# Patient Record
Sex: MALE | Race: WHITE | URBAN - METROPOLITAN AREA
[De-identification: names, ages, dates, MRNs, and addresses within clinical notes are randomized per-mention and may not be internally consistent; named-entity substitution may affect disease eponyms.]

---

## 2023-01-01 ENCOUNTER — HOSPICE ADMISSION (OUTPATIENT)
Dept: HOSPICE | Facility: HOSPICE | Age: 83
End: 2023-01-01
Payer: MEDICARE

## 2023-01-01 ENCOUNTER — HOME CARE VISIT (OUTPATIENT)
Dept: HOSPICE | Facility: HOSPICE | Age: 83
End: 2023-01-01
Payer: MEDICARE

## 2023-01-01 PROCEDURE — 0655 HSPC INPATIENT RESPITE

## 2023-01-01 PROCEDURE — 0651 HSPC ROUTINE HOME CARE

## 2023-01-01 PROCEDURE — 3331090004 HSPC SERVICE INTENSITY ADD-ON

## 2023-01-01 PROCEDURE — G0299 HHS/HOSPICE OF RN EA 15 MIN: HCPCS

## 2023-01-01 PROCEDURE — 0656 HSPC GENERAL INPATIENT

## 2023-01-01 RX ORDER — METOPROLOL SUCCINATE 25 MG/1
12.5 TABLET, EXTENDED RELEASE ORAL DAILY
COMMUNITY
Start: 2023-01-01 | End: 2023-01-01

## 2023-01-01 RX ORDER — TORSEMIDE 100 MG/1
1 TABLET ORAL DAILY
COMMUNITY
Start: 2023-01-01

## 2023-01-01 RX ORDER — ACETAMINOPHEN 325 MG/1
2 TABLET ORAL EVERY 6 HOURS PRN
COMMUNITY
Start: 2023-01-01

## 2023-01-01 RX ORDER — SPIRONOLACTONE 25 MG/1
1 TABLET ORAL DAILY
COMMUNITY
Start: 2023-01-01

## 2023-12-07 ENCOUNTER — HOME CARE VISIT (OUTPATIENT)
Dept: HOSPICE | Facility: HOSPICE | Age: 83
End: 2023-12-07
Payer: MEDICARE

## 2023-12-07 ENCOUNTER — HOME CARE VISIT (OUTPATIENT)
Dept: SCHEDULING | Facility: HOME HEALTH | Age: 83
End: 2023-12-07
Payer: MEDICARE

## 2023-12-07 PROBLEM — I70.263: Status: ACTIVE | Noted: 2023-12-07

## 2023-12-07 PROBLEM — I70.262: Status: ACTIVE | Noted: 2023-12-07

## 2023-12-07 PROCEDURE — G0299 HHS/HOSPICE OF RN EA 15 MIN: HCPCS

## 2023-12-08 ENCOUNTER — HOME CARE VISIT (OUTPATIENT)
Dept: SCHEDULING | Facility: HOME HEALTH | Age: 83
End: 2023-12-08
Payer: MEDICARE

## 2023-12-08 ENCOUNTER — HOME CARE VISIT (OUTPATIENT)
Dept: HOSPICE | Facility: HOSPICE | Age: 83
End: 2023-12-08
Payer: MEDICARE

## 2023-12-08 VITALS
TEMPERATURE: 97 F | DIASTOLIC BLOOD PRESSURE: 70 MMHG | RESPIRATION RATE: 20 BRPM | SYSTOLIC BLOOD PRESSURE: 110 MMHG | WEIGHT: 136 LBS | HEART RATE: 88 BPM | HEIGHT: 71 IN | BODY MASS INDEX: 19.04 KG/M2

## 2023-12-08 PROCEDURE — G0299 HHS/HOSPICE OF RN EA 15 MIN: HCPCS

## 2023-12-08 PROCEDURE — G0155 HHCP-SVS OF CSW,EA 15 MIN: HCPCS

## 2023-12-08 ASSESSMENT — ENCOUNTER SYMPTOMS
COUGH: 1
COUGH CHARACTERISTICS: NON-PRODUCTIVE
BOWEL INCONTINENCE: 1
PAIN LOCATION - PAIN QUALITY: SHARP

## 2023-12-08 NOTE — CASE COMMUNICATION
contacted and spoke with Elias Osullivan, pt's son who is currently on active duty in [facility]. Elias Osullivan declined  services r/t his father's verbal decline for a  to come in. Elias Osullivan is planning to come down tomorrow to visit with his father. Elias Osullivan stated he would revisit the option of  coming in and requested the  to call him next week to discuss further.  agreed. For now, 's initial visit, spir itual assessment, and services are declined.

## 2023-12-08 NOTE — HOSPICE
Mr. 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 Pt is being cared for in post acute facility in [address] [facility]. A thorough initial assessment was not possible due to his unpredictable irritability and agitation and  resulting in him being uncooperative and refusing wound assessment. Pt was easily rousable, he said he was doing alright and denied discomfort or pain at rest.  He said he only has severe pain in his feet if he has to get up or move He has a what appears to be a large size arterial insufficiency ulcer, possibly necrotic in his left dorsal foot. Also necrotic left toes. Right foot ulcer smaller than right foot. He has refused for hospice admission nurse to remove foot  dressing for inspection and assessment. Left sole of foot puffy. Both feet warm to touch; both knees cool to touch with tinge of mottling. Nail beds pale with touch of cyanosis. He had taken few bites of pureed type dinner and said food was not good. There was no evidence of dysphagia. He was breathing normal on room air and he denied chest tightness and  difficulty breathing. Lungs are essentially clear sridevi, and has occasional wet non productive cough. .He is bedbound, and incontinent, has a urinal at bedside. PPS 30, DNR. Family not present in room this evening. /70, HR 88 apical irreg, RR 20  Pts medical history include DM 2 , acute on chronic CHF with LVEF 20-25%, Atherosclerosis to lower extremities, dementia, CKD 3, chronic atrial fibrillation with cardiomyopathy, chronic SFA occlusion, pt refused amputation. Facility staff informed of hospice admission,  instructed staff to calll hospice with any questions or pt issues; meds profiled with Magan Palomino NP and pain medication script faxed to [address] [place] [address].

## 2023-12-09 ENCOUNTER — HOME CARE VISIT (OUTPATIENT)
Dept: HOSPICE | Facility: HOSPICE | Age: 83
End: 2023-12-09
Payer: MEDICARE

## 2023-12-12 ENCOUNTER — HOME CARE VISIT (OUTPATIENT)
Dept: SCHEDULING | Facility: HOME HEALTH | Age: 83
End: 2023-12-12
Payer: MEDICARE

## 2023-12-12 ENCOUNTER — HOME CARE VISIT (OUTPATIENT)
Dept: HOSPICE | Facility: HOSPICE | Age: 83
End: 2023-12-12
Payer: MEDICARE

## 2023-12-12 VITALS — HEART RATE: 85 BPM | SYSTOLIC BLOOD PRESSURE: 138 MMHG | DIASTOLIC BLOOD PRESSURE: 88 MMHG | RESPIRATION RATE: 16 BRPM

## 2023-12-12 PROBLEM — Z51.5 HOSPICE CARE: Status: ACTIVE | Noted: 2023-01-01

## 2023-12-12 PROCEDURE — G0156 HHCP-SVS OF AIDE,EA 15 MIN: HCPCS

## 2023-12-12 NOTE — CASE COMMUNICATION
contacted and spoke with Colin Bray, pt's son, who is travelling to [place], [place] today. 's call was a follow-up conversation with Colin Bray regarding Harlin Heimlich coming in to visit with his father. Colin Bray confirmed his father is declining 's initial visit, spiritual assessment, and  services.  affirmed their decision and stated that he will continue to work with the team and providing input in the plan of  care discussions and if he/his father change their minds and would like the  to visit to please call or let the RnCm know. Colin Bray stated that he would.  shared that he would pray for travel mercies for him as he goes to [facility]. Grupo appreciated the call. As for now,  services are declined.

## 2023-12-13 PROBLEM — I48.20 CHRONIC ATRIAL FIBRILLATION (HCC): Status: ACTIVE | Noted: 2023-09-13

## 2023-12-13 PROBLEM — I50.22 CHRONIC HFREF (HEART FAILURE WITH REDUCED EJECTION FRACTION) (HCC): Status: ACTIVE | Noted: 2023-09-13

## 2023-12-13 PROBLEM — J84.9 INTERSTITIAL LUNG DISEASE (HCC): Status: ACTIVE | Noted: 2020-03-18

## 2023-12-13 PROBLEM — I50.20 HEART FAILURE WITH REDUCED EJECTION FRACTION (HCC): Status: ACTIVE | Noted: 2023-01-01

## 2023-12-13 PROBLEM — T50.8X5A CONTRAST-INDUCED NEPHROPATHY: Status: ACTIVE | Noted: 2023-09-19

## 2023-12-13 PROBLEM — I10 PRIMARY HYPERTENSION: Status: ACTIVE | Noted: 2022-11-17

## 2023-12-13 PROBLEM — R53.81 DEBILITY: Status: ACTIVE | Noted: 2023-08-20

## 2023-12-13 PROBLEM — D52.9 ANEMIA DUE TO FOLIC ACID DEFICIENCY: Status: ACTIVE | Noted: 2022-12-20

## 2023-12-13 PROBLEM — J43.8 OTHER EMPHYSEMA (HCC): Status: ACTIVE | Noted: 2020-03-18

## 2023-12-13 PROBLEM — R13.10 DYSPHAGIA: Status: ACTIVE | Noted: 2022-09-09

## 2023-12-13 PROBLEM — I50.43 ACUTE ON CHRONIC COMBINED SYSTOLIC AND DIASTOLIC CONGESTIVE HEART FAILURE (HCC): Status: ACTIVE | Noted: 2020-03-18

## 2023-12-13 PROBLEM — I96 GANGRENE OF FOOT (HCC): Status: ACTIVE | Noted: 2023-01-01

## 2023-12-13 PROBLEM — I51.89 MILD LEFT VENTRICULAR SYSTOLIC DYSFUNCTION: Status: ACTIVE | Noted: 2022-11-17

## 2023-12-13 PROBLEM — F02.80 LATE ONSET ALZHEIMER'S DEMENTIA WITHOUT BEHAVIORAL DISTURBANCE (HCC): Status: ACTIVE | Noted: 2022-06-22

## 2023-12-13 PROBLEM — J96.01 ACUTE HYPOXIC RESPIRATORY FAILURE (HCC): Status: ACTIVE | Noted: 2023-11-27

## 2023-12-13 PROBLEM — R41.840 ATTENTION AND CONCENTRATION DEFICIT: Status: ACTIVE | Noted: 2022-07-12

## 2023-12-13 PROBLEM — E43 SEVERE PROTEIN-CALORIE MALNUTRITION (HCC): Status: ACTIVE | Noted: 2023-11-27

## 2023-12-13 PROBLEM — M1A.0720 IDIOPATHIC CHRONIC GOUT OF LEFT FOOT WITHOUT TOPHUS: Status: ACTIVE | Noted: 2020-03-18

## 2023-12-13 PROBLEM — G30.1 LATE ONSET ALZHEIMER'S DEMENTIA WITHOUT BEHAVIORAL DISTURBANCE (HCC): Status: ACTIVE | Noted: 2022-06-22

## 2023-12-13 PROBLEM — N14.11 CONTRAST-INDUCED NEPHROPATHY: Status: ACTIVE | Noted: 2023-09-19

## 2023-12-13 PROBLEM — N18.32 STAGE 3B CHRONIC KIDNEY DISEASE (HCC): Status: ACTIVE | Noted: 2020-03-18

## 2023-12-13 PROBLEM — E11.65 TYPE 2 DIABETES MELLITUS WITH HYPERGLYCEMIA (HCC): Status: ACTIVE | Noted: 2020-03-18

## 2023-12-14 ENCOUNTER — HOME CARE VISIT (OUTPATIENT)
Dept: HOSPICE | Facility: HOSPICE | Age: 83
End: 2023-12-14
Payer: MEDICARE

## 2023-12-14 ENCOUNTER — HOME CARE VISIT (OUTPATIENT)
Dept: SCHEDULING | Facility: HOME HEALTH | Age: 83
End: 2023-12-14
Payer: MEDICARE

## 2023-12-14 PROCEDURE — G0299 HHS/HOSPICE OF RN EA 15 MIN: HCPCS

## 2023-12-14 PROCEDURE — G0156 HHCP-SVS OF AIDE,EA 15 MIN: HCPCS

## 2023-12-26 ENCOUNTER — HOME CARE VISIT (OUTPATIENT)
Dept: SCHEDULING | Facility: HOME HEALTH | Age: 83
End: 2023-12-26
Payer: MEDICARE

## 2023-12-26 VITALS — SYSTOLIC BLOOD PRESSURE: 142 MMHG | RESPIRATION RATE: 16 BRPM | DIASTOLIC BLOOD PRESSURE: 92 MMHG

## 2023-12-26 PROCEDURE — G0156 HHCP-SVS OF AIDE,EA 15 MIN: HCPCS

## 2023-12-26 NOTE — HOSPICE
lighting/night lights  -Keep floor clean and dry. -Non-slip, well-fitting footwear. *Medications reviewed/managed: Pt is taking medications whole/crushed as prescribed and medications are managed by [facility] skilled nursing staff. [facility]) Skilled Nurse (SN) will review medications weekly. *Pt resides at [address] [place] [address] on the TCU unit. Coordination/Collaboration of pt's care occurs in various ways with the hospice Skilled Nurse (SN) and hospice care team via pt visits, the facility staff, pt's facility care plan meetings and during the hospice interdisciplinary team's biweekly meetings (IDG). Hospice drives pt's care, therefore; the facility staff are advised to contact [address]) [phone] regarding any pt concerns, fall(s) and/or any change in pt's condition. Facility staff voices an understanding regarding call hospice first, not 911.

## 2023-12-29 ENCOUNTER — HOME CARE VISIT (OUTPATIENT)
Dept: SCHEDULING | Facility: HOME HEALTH | Age: 83
End: 2023-12-29
Payer: MEDICARE

## 2023-12-29 PROCEDURE — G0156 HHCP-SVS OF AIDE,EA 15 MIN: HCPCS

## 2024-01-01 ENCOUNTER — HOME CARE VISIT (OUTPATIENT)
Dept: HOSPICE | Facility: HOSPICE | Age: 84
End: 2024-01-01
Payer: MEDICARE

## 2024-01-01 PROCEDURE — 0651 HSPC ROUTINE HOME CARE

## 2024-01-01 PROCEDURE — G0299 HHS/HOSPICE OF RN EA 15 MIN: HCPCS

## 2024-01-02 VITALS
SYSTOLIC BLOOD PRESSURE: 106 MMHG | DIASTOLIC BLOOD PRESSURE: 63 MMHG | RESPIRATION RATE: 16 BRPM | RESPIRATION RATE: 16 BRPM | HEART RATE: 78 BPM | SYSTOLIC BLOOD PRESSURE: 138 MMHG | DIASTOLIC BLOOD PRESSURE: 90 MMHG | HEART RATE: 96 BPM

## 2024-01-02 ASSESSMENT — ENCOUNTER SYMPTOMS
BOWEL INCONTINENCE: 1
STOOL DESCRIPTION: FORMED
BOWEL INCONTINENCE: 1
HEMOPTYSIS: 0
STOOL DESCRIPTION: FORMED

## 2024-01-02 NOTE — HOSPICE
an 84yo male with a hospice diagnosis of Critical limb ischemia of both lower extremities with gangrene        Pt is alert, fully oriented to self and communicates with comprehensible speech. Intermittent confusion noted.  Pt explained the wound care procedure, pt voices an understanding of the procedure.  *12/29/23- Wound Care performed by this nurse. Left foot ischemic wound- Performed hand hygiene, removed old dressing. Cleansed area with NS and applied hydrofera blue (saturated with NS to activate before placement) and covered with a foam dressing. 6.7cm x 5.5cm x 0.   *Pt tolerated wound care with minimum distress.   New Order:  *12/29/23- Roxanol 20mg/ml: Give 5mg (=0.25ml) PO/SL @ Noon & 9:00pm. Continue PRN Roxanol  -Pt has had his 12:00pm (Noon) dose.        *Neurological/Level of Consciousness (LOC): At Start of Care (SOC), pt is alert, fully oriented to self. Pt is able to communicate with normal, comprehensible speech, has appropriate concentration and follows commands.     *12/19/23- Intermittent confusion noted.      *Cardiovascular: Heart sounds are of an irregular rate and rhythm      *Respiratory: No noted respiratory distress at rest, respirations are even and unlabored. Lung sounds are clear throughout bases.     *Urinary/Gastrointestinal: Pt denies any voiding concerns, pt maintains a urinal at bedside and attempt to utilize it. . Bowel sounds present in all quads. Bowel Movements (BMs) are reported less daily. Pt is incontinent of bowel & bladder (B&B).    *Appetite/Nutrition: Pt reports a fair appetite, nutritional & fluid intake. No reported nausea/vomiting.   *12/14/23- The facility staff reports a poor nutritional intake. Pt is on a [facility] Soft diet.  *Pt continues to have a poor nutritional intake..    *Weight/Mid Upper Arm Circumference (MUAC): At Start of Care (SOC), Left arm MUAC =21cm.      *Integumentary (Skin): At Start of Care (SOC), pt wound not allow assessment or wound care of lower

## 2024-01-02 NOTE — HOSPICE
Assessment of death completed by Mario Cooper RN. Agency staff was not present at time of death. [facility] called to report patient Priyank Pardo was no longer breathing, This RN arrived at the facility and auscultated for heart and lung sounds for 3 minutes, no heart or breath sounds heard. Dr. Familia English was notified, time of death was 1/1/2023 at 8:30 pm Grupo (patient son)  at the bedside, grieving appropriately. Family   services at this time, and declined immediate SW services at this time, informed family that the SW will reach out. [facility] notified of patient's death, [facility] will  DME. Medications were disposed of per post acute agency and by facility staff. Informed family that the Bereavement services will also be reaching out. [employer] took possession of the body.

## 2024-01-02 NOTE — HOSPICE
[facility] ([facility]) Medical Director Virgilio Peters MD Home Visit      Hospice Medical Director MD Riki Home Visit: With permission from pt, the hospice MD performs a mini physical and cognitive assessment. Pt's current medications are thoroughly reviewed. Medication adjustments initiated and new medication(s) order(s) are discussed/explained to pt who voices an understanding to new medication order(s). MD also discusses pts diagnosis in depth, addresses all issues/concerns and answered all questions for pt. Pt's son (Grupo) who is out of town will be contacted regarding a report of this visit.    *New Orders:  -Discontinue Midodrine due to BP-157/130  -Discontinue Jardiance (empaglifozin) due to cachexia/weightloss        an 82yo male with a hospice diagnosis of Critical limb ischemia of both lower extremities with gangrene        *Neurological/Level of Consciousness (LOC): At Start of Care (SOC), pt is alert, fully oriented to self. Pt is able to communicate with normal, comprehensible speech, has appropriate concentration and follows commands.     *12/19/23- Intermittent confusion noted.      *Cardiovascular: Heart sounds are of an irregular rate and rhythm      *Respiratory: No noted respiratory distress at rest, respirations are even and unlabored. Lung sounds are clear throughout bases.     *Urinary/Gastrointestinal: Pt denies any voiding concerns, pt maintains a urinal at bedside and attempt to utilize it. . Bowel sounds present in all quads. Bowel Movements (BMs) are reported less daily. Pt is incontinent of bowel & bladder (B&B).    *Appetite/Nutrition: Pt reports a fair appetite, nutritional & fluid intake. No reported nausea/vomiting.   *12/14/23- The facility staff reports a poor nutritional intake. Pt is on a [facility] Soft diet.  *Pt continues to have a poor nutritional intake.    *Weight/Mid Upper Arm Circumference (MUAC): At Start of Care (SOC), Left arm MUAC =21cm.      *Integumentary (Skin): At